# Patient Record
Sex: MALE | Race: AMERICAN INDIAN OR ALASKA NATIVE | NOT HISPANIC OR LATINO | URBAN - METROPOLITAN AREA
[De-identification: names, ages, dates, MRNs, and addresses within clinical notes are randomized per-mention and may not be internally consistent; named-entity substitution may affect disease eponyms.]

---

## 2021-04-30 ENCOUNTER — OUTPATIENT (OUTPATIENT)
Dept: OUTPATIENT SERVICES | Facility: HOSPITAL | Age: 36
LOS: 1 days | Discharge: HOME | End: 2021-04-30

## 2021-04-30 ENCOUNTER — APPOINTMENT (OUTPATIENT)
Dept: GASTROENTEROLOGY | Facility: CLINIC | Age: 36
End: 2021-04-30
Payer: COMMERCIAL

## 2021-04-30 DIAGNOSIS — R05 COUGH: ICD-10-CM

## 2021-04-30 DIAGNOSIS — Z78.9 OTHER SPECIFIED HEALTH STATUS: ICD-10-CM

## 2021-04-30 DIAGNOSIS — K21.9 GASTRO-ESOPHAGEAL REFLUX DISEASE W/OUT ESOPHAGITIS: ICD-10-CM

## 2021-04-30 DIAGNOSIS — K21.9 GASTRO-ESOPHAGEAL REFLUX DISEASE WITHOUT ESOPHAGITIS: ICD-10-CM

## 2021-04-30 DIAGNOSIS — R63.4 ABNORMAL WEIGHT LOSS: ICD-10-CM

## 2021-04-30 PROBLEM — Z00.00 ENCOUNTER FOR PREVENTIVE HEALTH EXAMINATION: Status: ACTIVE | Noted: 2021-04-30

## 2021-04-30 PROCEDURE — 99204 OFFICE O/P NEW MOD 45 MIN: CPT

## 2021-04-30 RX ORDER — PANTOPRAZOLE 40 MG/1
40 TABLET, DELAYED RELEASE ORAL DAILY
Qty: 30 | Refills: 3 | Status: ACTIVE | COMMUNITY
Start: 2021-04-30 | End: 1900-01-01

## 2021-04-30 NOTE — REASON FOR VISIT
[Home] : at home, [unfilled] , at the time of the visit. [Medical Office: ([facility])___] : at the medical office located in  [Verbal consent obtained from patient] : the patient, [unfilled] [Consultation] : a consultation visit [FreeTextEntry1] : Cough and weight loss

## 2021-04-30 NOTE — HISTORY OF PRESENT ILLNESS
[de-identified] : 34 yo M no PMHx evaluated for chronic cough most prominent after meals and upon waking up from sleep. Endorses Hx of GERD not on PPIs. Also endorses 5-7lbs weight loss.\par Denies hematochezia, hematemesis.\par No FMhx of CRC, gatsric Ca\par Not on meds

## 2021-04-30 NOTE — ASSESSMENT
[FreeTextEntry1] : 36 yo M\par Chronic cough\par GERD\par Weight loss\par \par Plan\par blood work\par CT chest w IV contrast

## 2021-05-06 ENCOUNTER — NON-APPOINTMENT (OUTPATIENT)
Age: 36
End: 2021-05-06

## 2021-05-30 ENCOUNTER — RESULT REVIEW (OUTPATIENT)
Age: 36
End: 2021-05-30

## 2021-05-31 PROCEDURE — 71260 CT THORAX DX C+: CPT | Mod: 26

## 2021-06-01 ENCOUNTER — OUTPATIENT (OUTPATIENT)
Dept: OUTPATIENT SERVICES | Facility: HOSPITAL | Age: 36
LOS: 1 days | Discharge: HOME | End: 2021-06-01
Payer: COMMERCIAL

## 2021-06-01 DIAGNOSIS — R05 COUGH: ICD-10-CM

## 2021-06-01 PROCEDURE — 71260 CT THORAX DX C+: CPT | Mod: 26

## 2021-08-25 LAB
25(OH)D3 SERPL-MCNC: 21 NG/ML
ALBUMIN SERPL ELPH-MCNC: 4.6 G/DL
ALP BLD-CCNC: 60 U/L
ALT SERPL-CCNC: 9 U/L
ANION GAP SERPL CALC-SCNC: 7 MMOL/L
AST SERPL-CCNC: 19 U/L
BASOPHILS # BLD AUTO: 0.04 K/UL
BASOPHILS NFR BLD AUTO: 0.6 %
BILIRUB SERPL-MCNC: 0.7 MG/DL
BUN SERPL-MCNC: 12 MG/DL
CALCIUM SERPL-MCNC: 9.9 MG/DL
CHLORIDE SERPL-SCNC: 103 MMOL/L
CHOLEST SERPL-MCNC: 263 MG/DL
CO2 SERPL-SCNC: 28 MMOL/L
CREAT SERPL-MCNC: 0.9 MG/DL
EOSINOPHIL # BLD AUTO: 0.06 K/UL
EOSINOPHIL NFR BLD AUTO: 0.9 %
ESTIMATED AVERAGE GLUCOSE: 108 MG/DL
GLUCOSE SERPL-MCNC: 96 MG/DL
HBA1C MFR BLD HPLC: 5.4 %
HCT VFR BLD CALC: 45.3 %
HDLC SERPL-MCNC: 57 MG/DL
HGB BLD-MCNC: 14.6 G/DL
IMM GRANULOCYTES NFR BLD AUTO: 0.3 %
LDLC SERPL CALC-MCNC: 199 MG/DL
LYMPHOCYTES # BLD AUTO: 1.64 K/UL
LYMPHOCYTES NFR BLD AUTO: 25.1 %
MAN DIFF?: NORMAL
MCHC RBC-ENTMCNC: 29.1 PG
MCHC RBC-ENTMCNC: 32.2 G/DL
MCV RBC AUTO: 90.2 FL
MONOCYTES # BLD AUTO: 0.44 K/UL
MONOCYTES NFR BLD AUTO: 6.7 %
NEUTROPHILS # BLD AUTO: 4.34 K/UL
NEUTROPHILS NFR BLD AUTO: 66.4 %
NONHDLC SERPL-MCNC: 206 MG/DL
PLATELET # BLD AUTO: 323 K/UL
POTASSIUM SERPL-SCNC: 4.4 MMOL/L
PROT SERPL-MCNC: 7.4 G/DL
RBC # BLD: 5.02 M/UL
RBC # FLD: 12.6 %
SODIUM SERPL-SCNC: 138 MMOL/L
TRIGL SERPL-MCNC: 91 MG/DL
TSH SERPL-ACNC: 1.53 UIU/ML
WBC # FLD AUTO: 6.54 K/UL

## 2021-09-14 DIAGNOSIS — M25.50 PAIN IN UNSPECIFIED JOINT: ICD-10-CM

## 2021-10-19 ENCOUNTER — OUTPATIENT (OUTPATIENT)
Dept: OUTPATIENT SERVICES | Facility: HOSPITAL | Age: 36
LOS: 1 days | Discharge: HOME | End: 2021-10-19
Payer: COMMERCIAL

## 2021-10-19 DIAGNOSIS — E78.5 HYPERLIPIDEMIA, UNSPECIFIED: ICD-10-CM

## 2021-10-19 DIAGNOSIS — R09.89 OTHER SPECIFIED SYMPTOMS AND SIGNS INVOLVING THE CIRCULATORY AND RESPIRATORY SYSTEMS: ICD-10-CM

## 2021-10-19 DIAGNOSIS — E78.01 FAMILIAL HYPERCHOLESTEROLEMIA: ICD-10-CM

## 2021-10-19 PROCEDURE — 75571 CT HRT W/O DYE W/CA TEST: CPT | Mod: 26

## 2021-10-19 PROCEDURE — 93880 EXTRACRANIAL BILAT STUDY: CPT | Mod: 26

## 2021-10-21 LAB — URATE SERPL-MCNC: 5.3 MG/DL

## 2021-10-25 LAB
TESTOST BND SERPL-MCNC: 13.9 PG/ML
TESTOSTERONE TOTAL S: 403 NG/DL

## 2022-01-24 LAB — PSA SERPL-MCNC: 2.07 NG/ML

## 2023-03-13 NOTE — REVIEW OF SYSTEMS
"  Physical Therapy Daily Treatment Note     Name: Thomas Taveras  Clinic Number: [id number]    Therapy Diagnosis:   Encounter Diagnoses   Name Primary?    Right knee pain, unspecified chronicity Yes    Decreased ROM of right knee      Physician: Teodoro Marcus MD    Visit Date: 3/13/2023  Physician Orders: PT Eval and Treat   Medical Diagnosis from Referral: S83.511A (ICD-10-CM) - Complete tear of anterior cruciate ligament of right knee, initial encounter  Evaluation Date: 12/2/2022  Authorization Period Expiration: 12/01/2023  Plan of Care Expiration: 12/01/2023  Visit # / Visits authorized: 21/30    Time In: 1000  Time Out: 1100  Total Appointment Time (timed & untimed codes): 60 min      Precautions: Standard     Procedure:  Right anterior cruciate ligament reconstruction with bone patellar tendon bone autograft    Subjective     Pt reports: no soreness after last visit. Pt went for 2 mile walk over the weekend, min pain/swelling after walk. Traveling for work after todays visit.    He was compliant with home exercise program.  Response to previous treatment: HEP complaince  Functional change: ADLs w/ min complaint and some swelling    Pain: 0-3/10  Location: right knee      Objective     Date of surgery: 12/01/2022  POD 14 weeks 1 days    Range of Motion:   Knee Right Left   Active 2-0-125 2-0-135   Passive 2-0-128 2-0-135     Thomas received therapeutic exercises for 22 min to develop strength, endurance, ROM, flexibility, posture, and core stabilization including:     Patient education:  - swelling management  - avoiding aggravating activities!    Bike x6 min Lvl 5 full [place] for ROM and swelling management  Prone quad stretch 4x30"  SLR 1# 2x15 3" hold  SL bridge 3x10  Standing quad stretch 4x30"  Pt education on post op precautions, return to activity/exercise, signs of adverse effects of exercise    Held:  SL hip thrust 3xReps fail  LAQ on hammer strength 0# 4x15  Goblet Squats with mirror feedback 3x10 " "35#  [place] Single Leg Squat with blue sport cord 5x till burn  GTB clams 3x burn ea  Wall sit 3x30 sec - foot position   SL hip abduction 2# 3x15  Shuttle Squat Single Leg 2b 4x8     Thomas received the following manual therapy techniques: Joint mobilizations, Manual Lymphatic Drainage, and Soft tissue Mobilization were applied to the: R knee for 10 minutes, including:    Patellar mobs inferior / medial  Knee ROM assessment    Held:  Tibial IR mobilization grade III-IV      Thomas participated in neuromuscular re-education activities to improve: Balance, Coordination, Kinesthetic, Sense, Proprioception, and Posture for 28 minutes. The following activities were included:    Isometric quad at 960 deg 30 sec x5  3" step down 3x10  Slant board squat w/ 25# 4x10  Retro sled pull 90# 3x40 ft  sled push 90# 3x40 ft    Held:  Side steps w/ GTB 3 laps    Knee extension matrix DL 15-25# 4x10 ea - cue for quad loading  SL RDL 26lbs KB 3x10 ea  Eccentric bulgarians 5x5 reps ea - 5 sec count down / up both sides  Step Down 4 in with blue TKE with single UE support 3x10  Bwd 5 in toe taps 3x10 ea with 10lb KB  Lat 5 in toe taps 3x10 ea with stick for min assist in balance  GTB standing clam 3x10x5 sec both sides      Home Exercises Provided and Patient Education Provided     Education provided:   - see above    Written Home Exercises Provided: Patient instructed to cont prior HEP.  Exercises were reviewed and Thomas was able to demonstrate them prior to the end of the session.  Thomas demonstrated good  understanding of the education provided.     See EMR under Patient Instructions for exercises provided  2/10/2023 .     Assessment     Pt with continued deficits in knee flexion and quad strength. LAQ held today 2/2 pain / "pressure" retropatellar. Pt tolerates quad isometrics well. Pt educated on importance of regaining full knee extension and focusing on isolated quad strengthening to improve patellar tracking. Pt will continue to " benefit from isolated quad strength/ flexibility exercises to tolerance.    Thomas Is progressing well towards his goals.   Pt prognosis is Excellent.     Pt will continue to benefit from skilled outpatient physical therapy to address the deficits listed in the problem list box on initial evaluation, provide pt/family education and to maximize pt's level of independence in the home and community environment.     Pt's spiritual, cultural and educational needs considered and pt agreeable to plan of care and goals.    Anticipated barriers to physical therapy: none    GOALS: Short Term Goals: 0-3 months  1.Report decreased R knee pain  < / =  1/10  to increase tolerance for amb  2. Increase knee ROM to 3-0-145 in order to be able to perform ADLs without difficulty.  3. Increase strength by 1/3 MMT grade in Quads to increase tolerance for ADL and work activities.  4. Able to amb w/o deviation or complaint  5. Pt to tolerate HEP to improve ROM and independence with ADL's     Long Term Goals: 3-9 months  1. Able to return to running linearly   2. Able to complete [place] return to sport testing  2.Patient goal: Return to [facility]  3.Increase strength to >/= 4+/5 in Quad and hip musculature to increase tolerance for ADL and work activities.  4. Pt will report at CJ level (20-40% impaired) on FOTO knee to demonstrate increase in LE function with every day tasks.     Plan     See POC in treatment section for evaluation; Recommend cont care 2x a week.    Андрей Jang, PT, DPT, SCS            .   [Cough] : cough [As Noted in HPI] : as noted in HPI [Negative] : Heme/Lymph